# Patient Record
Sex: FEMALE | Race: WHITE | NOT HISPANIC OR LATINO | Employment: OTHER | URBAN - METROPOLITAN AREA
[De-identification: names, ages, dates, MRNs, and addresses within clinical notes are randomized per-mention and may not be internally consistent; named-entity substitution may affect disease eponyms.]

---

## 2017-01-20 ENCOUNTER — TELEPHONE (OUTPATIENT)
Dept: FAMILY MEDICINE | Age: 42
End: 2017-01-20

## 2017-01-23 ENCOUNTER — TELEPHONE (OUTPATIENT)
Dept: FAMILY MEDICINE | Age: 42
End: 2017-01-23

## 2017-01-24 ENCOUNTER — TELEPHONE (OUTPATIENT)
Dept: FAMILY MEDICINE | Age: 42
End: 2017-01-24

## 2017-01-24 DIAGNOSIS — F41.9 ANXIETY: ICD-10-CM

## 2017-01-24 RX ORDER — BUSPIRONE HYDROCHLORIDE 15 MG/1
7.5 TABLET ORAL 2 TIMES DAILY
Qty: 90 TABLET | Refills: 0 | Status: SHIPPED | OUTPATIENT
Start: 2017-01-24 | End: 2018-06-06 | Stop reason: SDUPTHER

## 2017-04-10 ENCOUNTER — OFFICE VISIT (OUTPATIENT)
Dept: URGENT CARE | Facility: PHYSICIAN GROUP | Age: 42
End: 2017-04-10
Payer: COMMERCIAL

## 2017-04-10 ENCOUNTER — TELEPHONE (OUTPATIENT)
Dept: URGENT CARE | Facility: PHYSICIAN GROUP | Age: 42
End: 2017-04-10

## 2017-04-10 VITALS
BODY MASS INDEX: 38.25 KG/M2 | HEIGHT: 66 IN | WEIGHT: 238 LBS | DIASTOLIC BLOOD PRESSURE: 84 MMHG | SYSTOLIC BLOOD PRESSURE: 122 MMHG | TEMPERATURE: 98.2 F | RESPIRATION RATE: 18 BRPM | HEART RATE: 86 BPM | OXYGEN SATURATION: 98 %

## 2017-04-10 DIAGNOSIS — S93.402A SPRAIN OF LEFT ANKLE, UNSPECIFIED LIGAMENT, INITIAL ENCOUNTER: ICD-10-CM

## 2017-04-10 PROCEDURE — 99203 OFFICE O/P NEW LOW 30 MIN: CPT | Performed by: PHYSICIAN ASSISTANT

## 2017-04-10 RX ORDER — IBUPROFEN 200 MG
200 TABLET ORAL EVERY 6 HOURS PRN
COMMUNITY
End: 2024-01-10

## 2017-04-10 ASSESSMENT — ENCOUNTER SYMPTOMS
CHILLS: 0
TINGLING: 0
LOSS OF MOTION: 0
LOSS OF SENSATION: 0
HEADACHES: 0
NAUSEA: 0
SHORTNESS OF BREATH: 0
VOMITING: 0
FEVER: 0
INABILITY TO BEAR WEIGHT: 1
DIARRHEA: 0
ABDOMINAL PAIN: 0
DIZZINESS: 0

## 2017-04-10 ASSESSMENT — PAIN SCALES - GENERAL: PAINLEVEL: 8=MODERATE-SEVERE PAIN

## 2017-04-10 ASSESSMENT — PATIENT HEALTH QUESTIONNAIRE - PHQ9: CLINICAL INTERPRETATION OF PHQ2 SCORE: 0

## 2017-04-10 NOTE — PROGRESS NOTES
"Subjective:      Jazlyn Kurtz is a 41 y.o. female who presents with Ankle Injury and Foot Swelling            Ankle Injury   The incident occurred 12 to 24 hours ago. The incident occurred at home. The injury mechanism was an inversion injury. The pain is present in the left ankle and left leg. The quality of the pain is described as aching. The pain is at a severity of 8/10. The pain is moderate. The pain has been constant since onset. Associated symptoms include an inability to bear weight. Pertinent negatives include no loss of motion, loss of sensation or tingling. She reports no foreign bodies present. The symptoms are aggravated by movement, weight bearing and palpation. She has tried NSAIDs for the symptoms. The treatment provided mild relief.     Patient presents to urgent care reporting an inversion injury to her left ankle yesterday. She states she also rolled the ankle 2 weeks ago, it was painful for about a week, then resolved. She rolled the same ankle again last night and states it is much more painful than the original injury. She reports swelling and bruising mostly on her lateral ankle. She also reports some pain in the left lower leg.     Review of Systems   Constitutional: Negative for fever and chills.   Respiratory: Negative for shortness of breath.    Cardiovascular: Negative for chest pain.   Gastrointestinal: Negative for nausea, vomiting, abdominal pain and diarrhea.   Genitourinary: Negative.    Musculoskeletal:        Positive for leg and ankle pain   Neurological: Negative for dizziness, tingling and headaches.          Objective:     /84 mmHg  Pulse 86  Temp(Src) 36.8 °C (98.2 °F)  Resp 18  Ht 1.676 m (5' 5.98\")  Wt 107.956 kg (238 lb)  BMI 38.43 kg/m2  SpO2 98%     Physical Exam   Constitutional: She is oriented to person, place, and time. She appears well-developed and well-nourished. No distress.   HENT:   Head: Normocephalic and atraumatic.   Eyes: Pupils are equal, " round, and reactive to light.   Neck: Normal range of motion.   Cardiovascular: Normal rate.    Pulmonary/Chest: Effort normal.   Musculoskeletal:        Left ankle: She exhibits decreased range of motion, swelling and ecchymosis. She exhibits normal pulse. Tenderness. Lateral malleolus tenderness found. Achilles tendon exhibits no pain.        Feet:    Neurological: She is alert and oriented to person, place, and time.   Skin: Skin is warm and dry. She is not diaphoretic.   Psychiatric: She has a normal mood and affect. Her behavior is normal.   Nursing note and vitals reviewed.         PMH:  has no past medical history on file.  MEDS:   Current outpatient prescriptions:   •  ibuprofen (MOTRIN) 200 MG Tab, Take 200 mg by mouth every 6 hours as needed., Disp: , Rfl:   ALLERGIES:   Allergies   Allergen Reactions   • Pcn [Penicillins]      SURGHX: History reviewed. No pertinent past surgical history.  SOCHX:  reports that she has been smoking Cigarettes.  She has been smoking about 0.25 packs per day. She has never used smokeless tobacco. She reports that she drinks alcohol.  FH: family history is not on file.       Assessment/Plan:     1. Sprain of left ankle, unspecified ligament, initial encounter  - DX-ANKLE 3+ VIEWS LEFT; Future  - DX-TIBIA AND FIBULA LEFT; Future    Radiologist interpretation is as follows:   1. No obvious acute bony abnormality  2. Lateral ankle soft tissue swelling consistent with a sprain, correlate clinically.   3. Large plantar spur on the calcaneous    Advised patient of her results. Her ankle was wrapped with ace bandage in the clinic. She has crutches from home she is using to ambulate. Advised limited weight bearing for 1-2 weeks. Encouraged RICE therapy and OTC Ibuprofen as needed for pain, take with food. Ice and elevate the ankle 2-3 times throughout the day.

## 2017-04-10 NOTE — TELEPHONE ENCOUNTER
Called patient let her know that she should use her crutches for a week, weight bearing only as tolerated.  Keep using the ice an ibuprofen for pain as needed.  Patient understands, but will need a note for work.  Can we fax it to [phone].

## 2017-04-10 NOTE — Clinical Note
April 10, 2017         Patient: Jazlyn Kurtz   YOB: 1975   Date of Visit: 4/10/2017           To Whom it May Concern:    Jazlyn Kurtz was seen in my clinic on 4/10/2017 for an ankle injury. She should remain on crutches for the rest of this week.     If you have any questions or concerns, please don't hesitate to call.        Sincerely,           Tamika Baxter PA-C  Electronically Signed

## 2017-05-17 ENCOUNTER — HOSPITAL ENCOUNTER (OUTPATIENT)
Dept: REHABILITATION | Age: 42
Discharge: STILL ADMITTED OR ADMINISTRATIVE | End: 2017-05-17
Attending: ORTHOPAEDIC SURGERY

## 2017-05-17 DIAGNOSIS — M79.641 RIGHT HAND PAIN: ICD-10-CM

## 2017-05-17 PROCEDURE — 97018 PARAFFIN BATH THERAPY: CPT | Performed by: OCCUPATIONAL THERAPIST

## 2017-05-17 PROCEDURE — 10003343 HB MISC ORTHOTIC OR PROSTHETIC: Performed by: OCCUPATIONAL THERAPIST

## 2017-05-17 PROCEDURE — 10004172 HB COUNTER-THERAPY VISIT OT: Performed by: OCCUPATIONAL THERAPIST

## 2017-05-17 PROCEDURE — 97530 THERAPEUTIC ACTIVITIES: CPT | Performed by: OCCUPATIONAL THERAPIST

## 2017-05-17 PROCEDURE — 10004310 HB COUNTER-SPLINT UNITS PER 15 MIN: Performed by: OCCUPATIONAL THERAPIST

## 2017-05-17 PROCEDURE — L3808 WHFO, RIGID W/O JOINTS: HCPCS | Performed by: OCCUPATIONAL THERAPIST

## 2017-05-22 ENCOUNTER — OFF PREMISE (OUTPATIENT)
Dept: OTHER | Age: 42
End: 2017-05-22

## 2017-05-23 ENCOUNTER — APPOINTMENT (OUTPATIENT)
Dept: REHABILITATION | Age: 42
End: 2017-05-23
Attending: ORTHOPAEDIC SURGERY

## 2017-05-26 ENCOUNTER — APPOINTMENT (OUTPATIENT)
Dept: REHABILITATION | Age: 42
End: 2017-05-26
Attending: ORTHOPAEDIC SURGERY

## 2017-05-30 ENCOUNTER — APPOINTMENT (OUTPATIENT)
Dept: REHABILITATION | Age: 42
End: 2017-05-30
Attending: ORTHOPAEDIC SURGERY

## 2017-06-01 ENCOUNTER — APPOINTMENT (OUTPATIENT)
Dept: REHABILITATION | Age: 42
End: 2017-06-01
Attending: ORTHOPAEDIC SURGERY

## 2017-11-19 DIAGNOSIS — F41.9 ANXIETY: ICD-10-CM

## 2017-11-20 RX ORDER — ESCITALOPRAM OXALATE 20 MG/1
TABLET ORAL
Qty: 90 TABLET | Refills: 0 | Status: SHIPPED | OUTPATIENT
Start: 2017-11-20 | End: 2018-02-18 | Stop reason: SDUPTHER

## 2018-02-18 DIAGNOSIS — F41.9 ANXIETY: ICD-10-CM

## 2018-02-19 RX ORDER — ESCITALOPRAM OXALATE 20 MG/1
20 TABLET ORAL DAILY
Qty: 90 TABLET | Refills: 0 | Status: SHIPPED | OUTPATIENT
Start: 2018-02-19 | End: 2018-06-06 | Stop reason: SDUPTHER

## 2018-06-06 ENCOUNTER — E-ADVICE (OUTPATIENT)
Dept: FAMILY MEDICINE | Age: 43
End: 2018-06-06

## 2018-06-06 DIAGNOSIS — F41.9 ANXIETY: ICD-10-CM

## 2018-06-06 RX ORDER — BUSPIRONE HYDROCHLORIDE 15 MG/1
15 TABLET ORAL 2 TIMES DAILY
Qty: 180 TABLET | Refills: 0 | Status: SHIPPED | OUTPATIENT
Start: 2018-06-06 | End: 2018-08-31 | Stop reason: SDUPTHER

## 2018-06-06 RX ORDER — ESCITALOPRAM OXALATE 20 MG/1
20 TABLET ORAL DAILY
Qty: 90 TABLET | Refills: 0 | Status: SHIPPED | OUTPATIENT
Start: 2018-06-06 | End: 2018-08-31 | Stop reason: SDUPTHER

## 2018-08-31 DIAGNOSIS — F41.9 ANXIETY: ICD-10-CM

## 2018-08-31 RX ORDER — BUSPIRONE HYDROCHLORIDE 15 MG/1
TABLET ORAL
Qty: 60 TABLET | Refills: 0 | Status: SHIPPED | OUTPATIENT
Start: 2018-08-31 | End: 2022-09-28 | Stop reason: ALTCHOICE

## 2018-08-31 RX ORDER — ESCITALOPRAM OXALATE 20 MG/1
TABLET ORAL
Qty: 30 TABLET | Refills: 0 | Status: SHIPPED | OUTPATIENT
Start: 2018-08-31 | End: 2018-09-26 | Stop reason: SDUPTHER

## 2018-09-26 DIAGNOSIS — F41.9 ANXIETY: ICD-10-CM

## 2018-09-26 RX ORDER — ESCITALOPRAM OXALATE 20 MG/1
TABLET ORAL
Qty: 30 TABLET | Refills: 2 | Status: SHIPPED | OUTPATIENT
Start: 2018-09-26 | End: 2018-10-22 | Stop reason: SDUPTHER

## 2018-10-22 DIAGNOSIS — F41.9 ANXIETY: ICD-10-CM

## 2018-10-22 RX ORDER — ESCITALOPRAM OXALATE 20 MG/1
20 TABLET ORAL DAILY
Qty: 90 TABLET | Refills: 0 | Status: SHIPPED | OUTPATIENT
Start: 2018-10-22 | End: 2019-01-20 | Stop reason: SDUPTHER

## 2018-10-22 RX ORDER — ESCITALOPRAM OXALATE 20 MG/1
20 TABLET ORAL DAILY
Qty: 90 TABLET | Refills: 0 | Status: SHIPPED | OUTPATIENT
Start: 2018-10-22 | End: 2018-10-22 | Stop reason: SDUPTHER

## 2018-10-22 RX ORDER — ESCITALOPRAM OXALATE 20 MG/1
20 TABLET ORAL DAILY
Qty: 30 TABLET | Refills: 2 | OUTPATIENT
Start: 2018-10-22

## 2019-01-20 DIAGNOSIS — F41.9 ANXIETY: ICD-10-CM

## 2019-01-21 RX ORDER — ESCITALOPRAM OXALATE 20 MG/1
20 TABLET ORAL DAILY
Qty: 30 TABLET | Refills: 0 | Status: SHIPPED | OUTPATIENT
Start: 2019-01-21 | End: 2019-02-18 | Stop reason: SDUPTHER

## 2019-02-18 DIAGNOSIS — F41.9 ANXIETY: ICD-10-CM

## 2019-02-18 RX ORDER — ESCITALOPRAM OXALATE 20 MG/1
20 TABLET ORAL DAILY
Qty: 30 TABLET | Refills: 0 | Status: SHIPPED | OUTPATIENT
Start: 2019-02-18 | End: 2020-10-09

## 2019-04-24 ENCOUNTER — NON-PROVIDER VISIT (OUTPATIENT)
Dept: MEDICAL GROUP | Facility: CLINIC | Age: 44
End: 2019-04-24
Payer: COMMERCIAL

## 2019-04-24 DIAGNOSIS — Z22.7 INACTIVE TUBERCULOSIS: ICD-10-CM

## 2019-04-24 PROCEDURE — 36415 COLL VENOUS BLD VENIPUNCTURE: CPT | Performed by: FAMILY MEDICINE

## 2020-08-29 ENCOUNTER — HOSPITAL ENCOUNTER (EMERGENCY)
Age: 45
Discharge: HOME | End: 2020-08-29
Attending: EMERGENCY MEDICINE

## 2020-08-29 ENCOUNTER — APPOINTMENT (OUTPATIENT)
Dept: GENERAL RADIOLOGY | Age: 45
End: 2020-08-29
Attending: EMERGENCY MEDICINE

## 2020-08-29 ENCOUNTER — APPOINTMENT (OUTPATIENT)
Dept: MRI IMAGING | Age: 45
End: 2020-08-29
Attending: EMERGENCY MEDICINE

## 2020-08-29 ENCOUNTER — APPOINTMENT (OUTPATIENT)
Dept: CT IMAGING | Age: 45
End: 2020-08-29
Attending: EMERGENCY MEDICINE

## 2020-08-29 VITALS
HEIGHT: 67 IN | HEART RATE: 61 BPM | WEIGHT: 204.81 LBS | SYSTOLIC BLOOD PRESSURE: 122 MMHG | DIASTOLIC BLOOD PRESSURE: 76 MMHG | RESPIRATION RATE: 16 BRPM | BODY MASS INDEX: 32.15 KG/M2 | TEMPERATURE: 98.7 F | OXYGEN SATURATION: 97 %

## 2020-08-29 DIAGNOSIS — M54.12 CERVICAL RADICULOPATHY: ICD-10-CM

## 2020-08-29 DIAGNOSIS — R20.2 FACIAL PARESTHESIA: Primary | ICD-10-CM

## 2020-08-29 DIAGNOSIS — R20.2 PARESTHESIA OF ARM: ICD-10-CM

## 2020-08-29 DIAGNOSIS — R51.9 NONINTRACTABLE HEADACHE, UNSPECIFIED CHRONICITY PATTERN, UNSPECIFIED HEADACHE TYPE: ICD-10-CM

## 2020-08-29 DIAGNOSIS — R07.9 CHEST PAIN, UNSPECIFIED TYPE: ICD-10-CM

## 2020-08-29 LAB
ALBUMIN SERPL-MCNC: 3.2 G/DL (ref 3.6–5.1)
ALBUMIN/GLOB SERPL: 0.9 {RATIO} (ref 1–2.4)
ALP SERPL-CCNC: 58 UNITS/L (ref 45–117)
ALT SERPL-CCNC: 19 UNITS/L
ANION GAP SERPL CALC-SCNC: 10 MMOL/L (ref 10–20)
AST SERPL-CCNC: 20 UNITS/L
ATRIAL RATE (BPM): 62
BASOPHILS # BLD: 0 K/MCL (ref 0–0.3)
BASOPHILS NFR BLD: 0 %
BILIRUB SERPL-MCNC: 0.5 MG/DL (ref 0.2–1)
BUN SERPL-MCNC: 9 MG/DL (ref 6–20)
BUN/CREAT SERPL: 12 (ref 7–25)
CALCIUM SERPL-MCNC: 8.7 MG/DL (ref 8.4–10.2)
CHLORIDE SERPL-SCNC: 107 MMOL/L (ref 98–107)
CO2 SERPL-SCNC: 27 MMOL/L (ref 21–32)
CREAT SERPL-MCNC: 0.73 MG/DL (ref 0.51–0.95)
CRP SERPL-MCNC: 3.8 MG/DL
D DIMER PPP FEU-MCNC: 0.77 MG/L (FEU)
DEPRECATED RDW RBC: 49.5 FL (ref 39–50)
DIASTOLIC BLOOD PRESSURE: 71
EOSINOPHIL # BLD: 0.1 K/MCL (ref 0.1–0.5)
EOSINOPHIL NFR BLD: 1 %
ERYTHROCYTE [DISTWIDTH] IN BLOOD: 14.8 % (ref 11–15)
ERYTHROCYTE [SEDIMENTATION RATE] IN BLOOD BY WESTERGREN METHOD: 32 MM/HR (ref 0–20)
FASTING DURATION TIME PATIENT: ABNORMAL H
GFR SERPLBLD BASED ON 1.73 SQ M-ARVRAT: >90 ML/MIN/1.73M2
GLOBULIN SER-MCNC: 3.6 G/DL (ref 2–4)
GLUCOSE SERPL-MCNC: 68 MG/DL (ref 65–99)
HCT VFR BLD CALC: 36.6 % (ref 36–46.5)
HGB BLD-MCNC: 11.7 G/DL (ref 12–15.5)
IMM GRANULOCYTES # BLD AUTO: 0 K/MCL (ref 0–0.2)
IMM GRANULOCYTES # BLD: 0 %
LIPASE SERPL-CCNC: 98 UNITS/L (ref 73–393)
LYMPHOCYTES # BLD: 1.4 K/MCL (ref 1–4.8)
LYMPHOCYTES NFR BLD: 20 %
MAGNESIUM SERPL-MCNC: 1.8 MG/DL (ref 1.7–2.4)
MCH RBC QN AUTO: 29.3 PG (ref 26–34)
MCHC RBC AUTO-ENTMCNC: 32 G/DL (ref 32–36.5)
MCV RBC AUTO: 91.5 FL (ref 78–100)
MONOCYTES # BLD: 0.5 K/MCL (ref 0.3–0.9)
MONOCYTES NFR BLD: 7 %
NEUTROPHILS # BLD: 4.9 K/MCL (ref 1.8–7.7)
NEUTROPHILS NFR BLD: 72 %
NRBC BLD MANUAL-RTO: 0 /100 WBC
NT-PROBNP SERPL-MCNC: 161 PG/ML
P AXIS (DEGREES): 37
PLATELET # BLD AUTO: 270 K/MCL (ref 140–450)
POTASSIUM SERPL-SCNC: 3.2 MMOL/L (ref 3.4–5.1)
PR-INTERVAL (MSEC): 128
PROCALCITONIN SERPL IA-MCNC: <0.05 NG/ML
PROT SERPL-MCNC: 6.8 G/DL (ref 6.4–8.2)
QRS-INTERVAL (MSEC): 80
QT-INTERVAL (MSEC): 428
QTC: 435
R AXIS (DEGREES): 36
RBC # BLD: 4 MIL/MCL (ref 4–5.2)
REPORT TEXT: NORMAL
SODIUM SERPL-SCNC: 141 MMOL/L (ref 135–145)
SYSTOLIC BLOOD PRESSURE: 118
T AXIS (DEGREES): 16
TROPONIN I SERPL HS-MCNC: <0.02 NG/ML
TROPONIN I SERPL HS-MCNC: <0.02 NG/ML
TSH SERPL-ACNC: 3.35 MCUNITS/ML (ref 0.35–5)
VENTRICULAR RATE EKG/MIN (BPM): 62
WBC # BLD: 7 K/MCL (ref 4.2–11)

## 2020-08-29 PROCEDURE — 85652 RBC SED RATE AUTOMATED: CPT | Performed by: EMERGENCY MEDICINE

## 2020-08-29 PROCEDURE — 83880 ASSAY OF NATRIURETIC PEPTIDE: CPT | Performed by: EMERGENCY MEDICINE

## 2020-08-29 PROCEDURE — 70553 MRI BRAIN STEM W/O & W/DYE: CPT

## 2020-08-29 PROCEDURE — 80053 COMPREHEN METABOLIC PANEL: CPT | Performed by: EMERGENCY MEDICINE

## 2020-08-29 PROCEDURE — 10002805 HB CONTRAST AGENT: Performed by: RADIOLOGY

## 2020-08-29 PROCEDURE — 72156 MRI NECK SPINE W/O & W/DYE: CPT

## 2020-08-29 PROCEDURE — 87635 SARS-COV-2 COVID-19 AMP PRB: CPT | Performed by: EMERGENCY MEDICINE

## 2020-08-29 PROCEDURE — 10002803 HB RX 637: Performed by: EMERGENCY MEDICINE

## 2020-08-29 PROCEDURE — 71045 X-RAY EXAM CHEST 1 VIEW: CPT

## 2020-08-29 PROCEDURE — 36415 COLL VENOUS BLD VENIPUNCTURE: CPT

## 2020-08-29 PROCEDURE — 84145 PROCALCITONIN (PCT): CPT | Performed by: EMERGENCY MEDICINE

## 2020-08-29 PROCEDURE — 71275 CT ANGIOGRAPHY CHEST: CPT

## 2020-08-29 PROCEDURE — 83735 ASSAY OF MAGNESIUM: CPT | Performed by: EMERGENCY MEDICINE

## 2020-08-29 PROCEDURE — 84443 ASSAY THYROID STIM HORMONE: CPT | Performed by: EMERGENCY MEDICINE

## 2020-08-29 PROCEDURE — 83690 ASSAY OF LIPASE: CPT | Performed by: EMERGENCY MEDICINE

## 2020-08-29 PROCEDURE — 93005 ELECTROCARDIOGRAM TRACING: CPT | Performed by: EMERGENCY MEDICINE

## 2020-08-29 PROCEDURE — 84484 ASSAY OF TROPONIN QUANT: CPT | Performed by: EMERGENCY MEDICINE

## 2020-08-29 PROCEDURE — 86140 C-REACTIVE PROTEIN: CPT | Performed by: EMERGENCY MEDICINE

## 2020-08-29 PROCEDURE — 10002800 HB RX 250 W HCPCS: Performed by: EMERGENCY MEDICINE

## 2020-08-29 PROCEDURE — 85025 COMPLETE CBC W/AUTO DIFF WBC: CPT | Performed by: EMERGENCY MEDICINE

## 2020-08-29 PROCEDURE — 99285 EMERGENCY DEPT VISIT HI MDM: CPT

## 2020-08-29 PROCEDURE — 85379 FIBRIN DEGRADATION QUANT: CPT | Performed by: EMERGENCY MEDICINE

## 2020-08-29 PROCEDURE — A9585 GADOBUTROL INJECTION: HCPCS | Performed by: RADIOLOGY

## 2020-08-29 RX ORDER — PROCHLORPERAZINE EDISYLATE 5 MG/ML
10 INJECTION INTRAMUSCULAR; INTRAVENOUS ONCE
Status: DISCONTINUED | OUTPATIENT
Start: 2020-08-29 | End: 2020-08-29 | Stop reason: CLARIF

## 2020-08-29 RX ORDER — PANTOPRAZOLE SODIUM 40 MG/1
40 TABLET, DELAYED RELEASE ORAL DAILY
COMMUNITY

## 2020-08-29 RX ORDER — FLUOXETINE HYDROCHLORIDE 40 MG/1
40 CAPSULE ORAL DAILY
COMMUNITY
End: 2023-02-09

## 2020-08-29 RX ORDER — GADOBUTROL 604.72 MG/ML
10 INJECTION INTRAVENOUS ONCE
Status: COMPLETED | OUTPATIENT
Start: 2020-08-29 | End: 2020-08-29

## 2020-08-29 RX ORDER — GABAPENTIN 300 MG/1
300 CAPSULE ORAL 3 TIMES DAILY
Qty: 27 CAPSULE | Refills: 0 | Status: SHIPPED | OUTPATIENT
Start: 2020-08-29 | End: 2020-10-09

## 2020-08-29 RX ORDER — POTASSIUM CHLORIDE 20 MEQ/1
40 TABLET, EXTENDED RELEASE ORAL ONCE
Status: COMPLETED | OUTPATIENT
Start: 2020-08-29 | End: 2020-08-29

## 2020-08-29 RX ORDER — PROCHLORPERAZINE EDISYLATE 5 MG/ML
10 INJECTION INTRAMUSCULAR; INTRAVENOUS ONCE
Status: COMPLETED | OUTPATIENT
Start: 2020-08-29 | End: 2020-08-29

## 2020-08-29 RX ORDER — DIPHENHYDRAMINE HYDROCHLORIDE 50 MG/ML
25 INJECTION INTRAMUSCULAR; INTRAVENOUS ONCE
Status: COMPLETED | OUTPATIENT
Start: 2020-08-29 | End: 2020-08-29

## 2020-08-29 RX ADMIN — GADOBUTROL 10 ML: 604.72 INJECTION INTRAVENOUS at 18:25

## 2020-08-29 RX ADMIN — KETOROLAC TROMETHAMINE 30 MG: 30 INJECTION, SOLUTION INTRAMUSCULAR at 15:17

## 2020-08-29 RX ADMIN — POTASSIUM CHLORIDE 40 MEQ: 1500 TABLET, EXTENDED RELEASE ORAL at 15:53

## 2020-08-29 RX ADMIN — IOPAMIDOL 75 ML: 755 INJECTION, SOLUTION INTRAVENOUS at 16:52

## 2020-08-29 RX ADMIN — DIPHENHYDRAMINE HYDROCHLORIDE 25 MG: 50 INJECTION INTRAMUSCULAR; INTRAVENOUS at 15:15

## 2020-08-29 RX ADMIN — PROCHLORPERAZINE EDISYLATE 10 MG: 5 INJECTION INTRAMUSCULAR; INTRAVENOUS at 15:19

## 2020-08-29 ASSESSMENT — ENCOUNTER SYMPTOMS
NAUSEA: 1
DIZZINESS: 0
DIARRHEA: 0
ABDOMINAL PAIN: 1
ADENOPATHY: 0
VOMITING: 0
CONFUSION: 0
NUMBNESS: 1
FEVER: 0
HEADACHES: 0
COUGH: 1
SHORTNESS OF BREATH: 0
SORE THROAT: 0

## 2020-08-29 ASSESSMENT — PAIN DESCRIPTION - PAIN TYPE
TYPE: ACUTE PAIN;CHEST PAIN
TYPE: ACUTE PAIN;CHEST PAIN

## 2020-08-29 ASSESSMENT — PAIN SCALES - GENERAL
PAINLEVEL_OUTOF10: 5
PAINLEVEL_OUTOF10: 8
PAINLEVEL_OUTOF10: 5

## 2020-08-30 LAB
RAINBOW EXTRA TUBES HOLD SPECIMEN: NORMAL

## 2020-08-31 ENCOUNTER — TELEPHONE (OUTPATIENT)
Dept: NEUROLOGY | Age: 45
End: 2020-08-31

## 2020-09-01 LAB — SARS-COV-2 RNA RESP QL NAA+PROBE: NOT DETECTED

## 2020-10-09 ENCOUNTER — V-VISIT (OUTPATIENT)
Dept: NEUROLOGY | Age: 45
End: 2020-10-09
Attending: EMERGENCY MEDICINE

## 2020-10-09 DIAGNOSIS — R51.9 NONINTRACTABLE HEADACHE, UNSPECIFIED CHRONICITY PATTERN, UNSPECIFIED HEADACHE TYPE: ICD-10-CM

## 2020-10-09 DIAGNOSIS — R20.2 FACIAL PARESTHESIA: Primary | ICD-10-CM

## 2020-10-09 PROCEDURE — 99205 OFFICE O/P NEW HI 60 MIN: CPT | Performed by: PSYCHIATRY & NEUROLOGY

## 2020-10-09 RX ORDER — VERAPAMIL HYDROCHLORIDE 80 MG/1
TABLET ORAL
Qty: 90 TABLET | Refills: 1 | Status: SHIPPED | OUTPATIENT
Start: 2020-10-09 | End: 2022-09-28 | Stop reason: ALTCHOICE

## 2020-11-03 RX ORDER — VERAPAMIL HYDROCHLORIDE 80 MG/1
TABLET ORAL
Qty: 90 TABLET | Refills: 1 | OUTPATIENT
Start: 2020-11-03

## 2020-12-02 ENCOUNTER — TELEPHONE (OUTPATIENT)
Dept: NEUROLOGY | Age: 45
End: 2020-12-02

## 2020-12-11 RX ORDER — VERAPAMIL HYDROCHLORIDE 80 MG/1
TABLET ORAL
Qty: 90 TABLET | Refills: 1 | OUTPATIENT
Start: 2020-12-11

## 2021-01-06 ENCOUNTER — HOSPITAL ENCOUNTER (OUTPATIENT)
Dept: GENERAL RADIOLOGY | Age: 46
Discharge: HOME | End: 2021-01-06
Attending: NEUROLOGICAL SURGERY

## 2021-01-06 DIAGNOSIS — Z98.1 S/P CERVICAL SPINAL FUSION: ICD-10-CM

## 2021-01-06 PROBLEM — M54.10 RADICULOPATHY AFFECTING UPPER EXTREMITY: Status: ACTIVE | Noted: 2020-09-28

## 2021-01-06 PROBLEM — K92.1 BLOOD IN STOOL: Status: ACTIVE | Noted: 2020-08-12

## 2021-01-06 PROBLEM — M25.531 PAIN IN RIGHT WRIST: Status: ACTIVE | Noted: 2017-05-22

## 2021-01-06 PROBLEM — K43.2 INCISIONAL HERNIA: Status: ACTIVE | Noted: 2020-02-13

## 2021-01-06 PROBLEM — R10.2 ACUTE PELVIC PAIN, FEMALE: Status: ACTIVE | Noted: 2020-06-29

## 2021-01-06 PROBLEM — Z82.49 FAMILY HISTORY OF PREMATURE CAD: Status: ACTIVE | Noted: 2020-08-10

## 2021-01-06 PROBLEM — M54.2 CERVICAL PAIN: Status: ACTIVE | Noted: 2020-09-28

## 2021-01-06 PROBLEM — K21.9 GASTROESOPHAGEAL REFLUX DISEASE WITHOUT ESOPHAGITIS: Status: ACTIVE | Noted: 2020-08-12

## 2021-01-06 PROBLEM — N39.3 SUI (STRESS URINARY INCONTINENCE, FEMALE): Status: ACTIVE | Noted: 2020-05-29

## 2021-01-06 PROCEDURE — 72040 X-RAY EXAM NECK SPINE 2-3 VW: CPT

## 2021-03-16 ENCOUNTER — APPOINTMENT (OUTPATIENT)
Dept: CT IMAGING | Age: 46
End: 2021-03-16
Attending: EMERGENCY MEDICINE

## 2021-03-16 ENCOUNTER — HOSPITAL ENCOUNTER (EMERGENCY)
Age: 46
Discharge: HOME | End: 2021-03-16
Attending: EMERGENCY MEDICINE

## 2021-03-16 VITALS
OXYGEN SATURATION: 98 % | TEMPERATURE: 99.2 F | WEIGHT: 202.9 LBS | SYSTOLIC BLOOD PRESSURE: 147 MMHG | HEART RATE: 71 BPM | RESPIRATION RATE: 17 BRPM | BODY MASS INDEX: 31.84 KG/M2 | DIASTOLIC BLOOD PRESSURE: 91 MMHG | HEIGHT: 67 IN

## 2021-03-16 DIAGNOSIS — R10.84 GENERALIZED ABDOMINAL PAIN: Primary | ICD-10-CM

## 2021-03-16 DIAGNOSIS — I88.0 MESENTERIC ADENITIS: ICD-10-CM

## 2021-03-16 LAB
ALBUMIN SERPL-MCNC: 3.4 G/DL (ref 3.6–5.1)
ALBUMIN/GLOB SERPL: 0.9 {RATIO} (ref 1–2.4)
ALP SERPL-CCNC: 71 UNITS/L (ref 45–117)
ALT SERPL-CCNC: 23 UNITS/L
ANION GAP SERPL CALC-SCNC: 8 MMOL/L (ref 10–20)
APPEARANCE UR: NORMAL
AST SERPL-CCNC: 12 UNITS/L
ATRIAL RATE (BPM): 77
BASOPHILS # BLD: 0 K/MCL (ref 0–0.3)
BASOPHILS NFR BLD: 1 %
BILIRUB SERPL-MCNC: 0.2 MG/DL (ref 0.2–1)
BILIRUB UR QL STRIP: NEGATIVE
BUN SERPL-MCNC: 18 MG/DL (ref 6–20)
BUN/CREAT SERPL: 24 (ref 7–25)
CALCIUM SERPL-MCNC: 9 MG/DL (ref 8.4–10.2)
CHLORIDE SERPL-SCNC: 106 MMOL/L (ref 98–107)
CO2 SERPL-SCNC: 31 MMOL/L (ref 21–32)
COLOR UR: YELLOW
CREAT SERPL-MCNC: 0.74 MG/DL (ref 0.51–0.95)
DEPRECATED RDW RBC: 43.8 FL (ref 39–50)
EOSINOPHIL # BLD: 0.2 K/MCL (ref 0–0.5)
EOSINOPHIL NFR BLD: 2 %
ERYTHROCYTE [DISTWIDTH] IN BLOOD: 13.2 % (ref 11–15)
FASTING DURATION TIME PATIENT: ABNORMAL H
GFR SERPLBLD BASED ON 1.73 SQ M-ARVRAT: >90 ML/MIN/1.73M2
GLOBULIN SER-MCNC: 4 G/DL (ref 2–4)
GLUCOSE SERPL-MCNC: 108 MG/DL (ref 65–99)
GLUCOSE UR STRIP-MCNC: NEGATIVE MG/DL
HCT VFR BLD CALC: 43.4 % (ref 36–46.5)
HGB BLD-MCNC: 13.8 G/DL (ref 12–15.5)
HGB UR QL STRIP: NEGATIVE
IMM GRANULOCYTES # BLD AUTO: 0 K/MCL (ref 0–0.2)
IMM GRANULOCYTES # BLD: 0 %
KETONES UR STRIP-MCNC: NEGATIVE MG/DL
LEUKOCYTE ESTERASE UR QL STRIP: NEGATIVE
LIPASE SERPL-CCNC: 142 UNITS/L (ref 73–393)
LYMPHOCYTES # BLD: 1.5 K/MCL (ref 1–4.8)
LYMPHOCYTES NFR BLD: 17 %
MCH RBC QN AUTO: 28.6 PG (ref 26–34)
MCHC RBC AUTO-ENTMCNC: 31.8 G/DL (ref 32–36.5)
MCV RBC AUTO: 90 FL (ref 78–100)
MONOCYTES # BLD: 0.5 K/MCL (ref 0.3–0.9)
MONOCYTES NFR BLD: 6 %
NEUTROPHILS # BLD: 6.7 K/MCL (ref 1.8–7.7)
NEUTROPHILS NFR BLD: 74 %
NITRITE UR QL STRIP: NEGATIVE
NRBC BLD MANUAL-RTO: 0 /100 WBC
P AXIS (DEGREES): 66
PH UR STRIP: 5 [PH] (ref 5–7)
PLATELET # BLD AUTO: 299 K/MCL (ref 140–450)
POTASSIUM SERPL-SCNC: 4.1 MMOL/L (ref 3.4–5.1)
PR-INTERVAL (MSEC): 128
PROT SERPL-MCNC: 7.4 G/DL (ref 6.4–8.2)
PROT UR STRIP-MCNC: NEGATIVE MG/DL
QRS-INTERVAL (MSEC): 78
QT-INTERVAL (MSEC): 374
QTC: 423
R AXIS (DEGREES): 61
RBC # BLD: 4.82 MIL/MCL (ref 4–5.2)
REPORT TEXT: NORMAL
SARS-COV-2 RNA RESP QL NAA+PROBE: NOT DETECTED
SERVICE CMNT-IMP: NORMAL
SERVICE CMNT-IMP: NORMAL
SODIUM SERPL-SCNC: 141 MMOL/L (ref 135–145)
SP GR UR STRIP: 1.03 (ref 1–1.03)
T AXIS (DEGREES): 4
TROPONIN I SERPL HS-MCNC: <0.02 NG/ML
UROBILINOGEN UR STRIP-MCNC: 0.2 MG/DL
VENTRICULAR RATE EKG/MIN (BPM): 77
WBC # BLD: 8.9 K/MCL (ref 4.2–11)

## 2021-03-16 PROCEDURE — 10002800 HB RX 250 W HCPCS: Performed by: EMERGENCY MEDICINE

## 2021-03-16 PROCEDURE — 80053 COMPREHEN METABOLIC PANEL: CPT | Performed by: EMERGENCY MEDICINE

## 2021-03-16 PROCEDURE — 85025 COMPLETE CBC W/AUTO DIFF WBC: CPT | Performed by: EMERGENCY MEDICINE

## 2021-03-16 PROCEDURE — 93005 ELECTROCARDIOGRAM TRACING: CPT | Performed by: EMERGENCY MEDICINE

## 2021-03-16 PROCEDURE — 10002807 HB RX 258: Performed by: EMERGENCY MEDICINE

## 2021-03-16 PROCEDURE — U0003 INFECTIOUS AGENT DETECTION BY NUCLEIC ACID (DNA OR RNA); SEVERE ACUTE RESPIRATORY SYNDROME CORONAVIRUS 2 (SARS-COV-2) (CORONAVIRUS DISEASE [COVID-19]), AMPLIFIED PROBE TECHNIQUE, MAKING USE OF HIGH THROUGHPUT TECHNOLOGIES AS DESCRIBED BY CMS-2020-01-R: HCPCS | Performed by: EMERGENCY MEDICINE

## 2021-03-16 PROCEDURE — 10002803 HB RX 637: Performed by: EMERGENCY MEDICINE

## 2021-03-16 PROCEDURE — 10002805 HB CONTRAST AGENT: Performed by: RADIOLOGY

## 2021-03-16 PROCEDURE — 83690 ASSAY OF LIPASE: CPT | Performed by: EMERGENCY MEDICINE

## 2021-03-16 PROCEDURE — 96361 HYDRATE IV INFUSION ADD-ON: CPT

## 2021-03-16 PROCEDURE — 36415 COLL VENOUS BLD VENIPUNCTURE: CPT

## 2021-03-16 PROCEDURE — 96374 THER/PROPH/DIAG INJ IV PUSH: CPT

## 2021-03-16 PROCEDURE — 96375 TX/PRO/DX INJ NEW DRUG ADDON: CPT

## 2021-03-16 PROCEDURE — 84484 ASSAY OF TROPONIN QUANT: CPT | Performed by: EMERGENCY MEDICINE

## 2021-03-16 PROCEDURE — 74177 CT ABD & PELVIS W/CONTRAST: CPT

## 2021-03-16 PROCEDURE — 10002801 HB RX 250 W/O HCPCS: Performed by: EMERGENCY MEDICINE

## 2021-03-16 PROCEDURE — 81003 URINALYSIS AUTO W/O SCOPE: CPT | Performed by: EMERGENCY MEDICINE

## 2021-03-16 PROCEDURE — 99284 EMERGENCY DEPT VISIT MOD MDM: CPT

## 2021-03-16 RX ORDER — LIDOCAINE HYDROCHLORIDE 20 MG/ML
15 SOLUTION OROPHARYNGEAL ONCE
Status: COMPLETED | OUTPATIENT
Start: 2021-03-16 | End: 2021-03-16

## 2021-03-16 RX ORDER — ONDANSETRON 4 MG/1
4 TABLET, ORALLY DISINTEGRATING ORAL EVERY 8 HOURS PRN
Qty: 10 TABLET | Refills: 0 | Status: SHIPPED | OUTPATIENT
Start: 2021-03-16 | End: 2022-09-28 | Stop reason: ALTCHOICE

## 2021-03-16 RX ORDER — ONDANSETRON 2 MG/ML
4 INJECTION INTRAMUSCULAR; INTRAVENOUS ONCE
Status: COMPLETED | OUTPATIENT
Start: 2021-03-16 | End: 2021-03-16

## 2021-03-16 RX ORDER — MAGNESIUM HYDROXIDE/ALUMINUM HYDROXICE/SIMETHICONE 120; 1200; 1200 MG/30ML; MG/30ML; MG/30ML
30 SUSPENSION ORAL ONCE
Status: COMPLETED | OUTPATIENT
Start: 2021-03-16 | End: 2021-03-16

## 2021-03-16 RX ORDER — ACETAMINOPHEN 500 MG
1000 TABLET ORAL EVERY 6 HOURS PRN
Qty: 60 TABLET | Refills: 0 | Status: SHIPPED | OUTPATIENT
Start: 2021-03-16 | End: 2021-04-05

## 2021-03-16 RX ADMIN — ONDANSETRON 4 MG: 2 INJECTION INTRAMUSCULAR; INTRAVENOUS at 10:00

## 2021-03-16 RX ADMIN — LIDOCAINE HYDROCHLORIDE 15 ML: 20 SOLUTION ORAL; TOPICAL at 09:57

## 2021-03-16 RX ADMIN — ALUMINUM HYDROXIDE, MAGNESIUM HYDROXIDE, AND SIMETHICONE 30 ML: 200; 200; 20 SUSPENSION ORAL at 09:57

## 2021-03-16 RX ADMIN — SODIUM CHLORIDE 1000 ML: 9 INJECTION, SOLUTION INTRAVENOUS at 10:00

## 2021-03-16 RX ADMIN — MORPHINE SULFATE 4 MG: 4 INJECTION INTRAVENOUS at 10:49

## 2021-03-16 RX ADMIN — IOHEXOL 100 ML: 350 INJECTION, SOLUTION INTRAVENOUS at 10:24

## 2021-03-16 RX ADMIN — SODIUM CHLORIDE 70 ML: 9 INJECTION, SOLUTION INTRAVENOUS at 10:24

## 2021-03-16 ASSESSMENT — ENCOUNTER SYMPTOMS
HEADACHES: 1
WHEEZING: 0
COUGH: 0
CONSTIPATION: 0
CHILLS: 1
SHORTNESS OF BREATH: 0
HALLUCINATIONS: 0
DIZZINESS: 1
ABDOMINAL PAIN: 1
SEIZURES: 0
FEVER: 0
BLOOD IN STOOL: 0
ABDOMINAL DISTENTION: 0
VOMITING: 1
ACTIVITY CHANGE: 0
COLOR CHANGE: 0
WEAKNESS: 0
POLYPHAGIA: 0
SORE THROAT: 0
AGITATION: 0
SINUS PAIN: 0
DIAPHORESIS: 1
FATIGUE: 0
DIARRHEA: 1
POLYDIPSIA: 0
APPETITE CHANGE: 0
NAUSEA: 1
NUMBNESS: 0

## 2021-03-16 ASSESSMENT — PAIN DESCRIPTION - PAIN TYPE: TYPE: ACUTE PAIN

## 2021-03-16 ASSESSMENT — PAIN SCALES - GENERAL
PAINLEVEL_OUTOF10: 8
PAINLEVEL_OUTOF10: 7

## 2021-04-26 ENCOUNTER — HOSPITAL ENCOUNTER (OUTPATIENT)
Age: 46
Discharge: HOME | End: 2021-04-26

## 2021-04-26 VITALS
RESPIRATION RATE: 16 BRPM | DIASTOLIC BLOOD PRESSURE: 81 MMHG | SYSTOLIC BLOOD PRESSURE: 154 MMHG | BODY MASS INDEX: 32.31 KG/M2 | OXYGEN SATURATION: 100 % | WEIGHT: 205.9 LBS | TEMPERATURE: 97.7 F | HEIGHT: 67 IN | HEART RATE: 71 BPM

## 2021-04-26 DIAGNOSIS — R09.81 NASAL CONGESTION: Primary | ICD-10-CM

## 2021-04-26 DIAGNOSIS — R52 BODY ACHES: ICD-10-CM

## 2021-04-26 DIAGNOSIS — Z20.822 ENCOUNTER FOR LABORATORY TESTING FOR COVID-19 VIRUS: ICD-10-CM

## 2021-04-26 DIAGNOSIS — R51.9 GENERALIZED HEADACHES: ICD-10-CM

## 2021-04-26 LAB
SARS-COV-2 N GENE CT SPEC QN NAA N2: NORMAL
SARS-COV-2 RNA RESP QL NAA+PROBE: NOT DETECTED
SERVICE CMNT-IMP: NORMAL
SERVICE CMNT-IMP: NORMAL

## 2021-04-26 PROCEDURE — 87635 SARS-COV-2 COVID-19 AMP PRB: CPT | Performed by: PHYSICIAN ASSISTANT

## 2021-04-26 PROCEDURE — 99211 OFF/OP EST MAY X REQ PHY/QHP: CPT

## 2021-04-26 ASSESSMENT — PAIN SCALES - GENERAL: PAINLEVEL_OUTOF10: 1

## 2021-05-11 ENCOUNTER — HOSPITAL ENCOUNTER (EMERGENCY)
Age: 46
Discharge: HOME | End: 2021-05-11
Attending: EMERGENCY MEDICINE

## 2021-05-11 ENCOUNTER — APPOINTMENT (OUTPATIENT)
Dept: GENERAL RADIOLOGY | Age: 46
End: 2021-05-11
Attending: EMERGENCY MEDICINE

## 2021-05-11 ENCOUNTER — APPOINTMENT (OUTPATIENT)
Dept: ULTRASOUND IMAGING | Age: 46
End: 2021-05-11
Attending: EMERGENCY MEDICINE

## 2021-05-11 VITALS
TEMPERATURE: 98.9 F | HEIGHT: 67 IN | SYSTOLIC BLOOD PRESSURE: 149 MMHG | RESPIRATION RATE: 20 BRPM | WEIGHT: 206.13 LBS | BODY MASS INDEX: 32.35 KG/M2 | OXYGEN SATURATION: 100 % | DIASTOLIC BLOOD PRESSURE: 77 MMHG | HEART RATE: 61 BPM

## 2021-05-11 DIAGNOSIS — K21.9 GASTROESOPHAGEAL REFLUX DISEASE WITHOUT ESOPHAGITIS: ICD-10-CM

## 2021-05-11 DIAGNOSIS — R51.9 NONINTRACTABLE HEADACHE, UNSPECIFIED CHRONICITY PATTERN, UNSPECIFIED HEADACHE TYPE: ICD-10-CM

## 2021-05-11 DIAGNOSIS — R07.9 CHEST PAIN, UNSPECIFIED TYPE: Primary | ICD-10-CM

## 2021-05-11 LAB
ALBUMIN SERPL-MCNC: 3.3 G/DL (ref 3.6–5.1)
ALBUMIN/GLOB SERPL: 0.9 {RATIO} (ref 1–2.4)
ALP SERPL-CCNC: 58 UNITS/L (ref 45–117)
ALT SERPL-CCNC: 24 UNITS/L
ANION GAP SERPL CALC-SCNC: 9 MMOL/L (ref 10–20)
AST SERPL-CCNC: 15 UNITS/L
ATRIAL RATE (BPM): 69
BASOPHILS # BLD: 0 K/MCL (ref 0–0.3)
BASOPHILS NFR BLD: 1 %
BILIRUB SERPL-MCNC: 0.5 MG/DL (ref 0.2–1)
BUN SERPL-MCNC: 17 MG/DL (ref 6–20)
BUN/CREAT SERPL: 24 (ref 7–25)
CALCIUM SERPL-MCNC: 8.6 MG/DL (ref 8.4–10.2)
CHLORIDE SERPL-SCNC: 105 MMOL/L (ref 98–107)
CO2 SERPL-SCNC: 27 MMOL/L (ref 21–32)
CREAT SERPL-MCNC: 0.71 MG/DL (ref 0.51–0.95)
D DIMER PPP FEU-MCNC: 0.42 MG/L (FEU)
DEPRECATED RDW RBC: 45.4 FL (ref 39–50)
DIASTOLIC BLOOD PRESSURE: 79
EOSINOPHIL # BLD: 0.3 K/MCL (ref 0–0.5)
EOSINOPHIL NFR BLD: 3 %
ERYTHROCYTE [DISTWIDTH] IN BLOOD: 14.1 % (ref 11–15)
FASTING DURATION TIME PATIENT: ABNORMAL H
GFR SERPLBLD BASED ON 1.73 SQ M-ARVRAT: >90 ML/MIN/1.73M2
GLOBULIN SER-MCNC: 3.5 G/DL (ref 2–4)
GLUCOSE SERPL-MCNC: 87 MG/DL (ref 65–99)
HCT VFR BLD CALC: 40.5 % (ref 36–46.5)
HGB BLD-MCNC: 13.3 G/DL (ref 12–15.5)
IMM GRANULOCYTES # BLD AUTO: 0 K/MCL (ref 0–0.2)
IMM GRANULOCYTES # BLD: 0 %
LYMPHOCYTES # BLD: 1.4 K/MCL (ref 1–4.8)
LYMPHOCYTES NFR BLD: 17 %
MCH RBC QN AUTO: 29.2 PG (ref 26–34)
MCHC RBC AUTO-ENTMCNC: 32.8 G/DL (ref 32–36.5)
MCV RBC AUTO: 88.8 FL (ref 78–100)
MONOCYTES # BLD: 0.4 K/MCL (ref 0.3–0.9)
MONOCYTES NFR BLD: 5 %
NEUTROPHILS # BLD: 6.4 K/MCL (ref 1.8–7.7)
NEUTROPHILS NFR BLD: 74 %
NRBC BLD MANUAL-RTO: 0 /100 WBC
NT-PROBNP SERPL-MCNC: 90 PG/ML
P AXIS (DEGREES): 65
PLATELET # BLD AUTO: 265 K/MCL (ref 140–450)
POTASSIUM SERPL-SCNC: 4 MMOL/L (ref 3.4–5.1)
PR-INTERVAL (MSEC): 132
PROT SERPL-MCNC: 6.8 G/DL (ref 6.4–8.2)
QRS-INTERVAL (MSEC): 82
QT-INTERVAL (MSEC): 418
QTC: 448
R AXIS (DEGREES): 79
RAINBOW EXTRA TUBES HOLD SPECIMEN: NORMAL
RBC # BLD: 4.56 MIL/MCL (ref 4–5.2)
REPORT TEXT: NORMAL
SODIUM SERPL-SCNC: 137 MMOL/L (ref 135–145)
SYSTOLIC BLOOD PRESSURE: 149
T AXIS (DEGREES): 39
TROPONIN I SERPL HS-MCNC: <0.02 NG/ML
VENTRICULAR RATE EKG/MIN (BPM): 69
WBC # BLD: 8.5 K/MCL (ref 4.2–11)

## 2021-05-11 PROCEDURE — 10004157 XR CHEST AP OR PA

## 2021-05-11 PROCEDURE — 84484 ASSAY OF TROPONIN QUANT: CPT | Performed by: EMERGENCY MEDICINE

## 2021-05-11 PROCEDURE — 93971 EXTREMITY STUDY: CPT

## 2021-05-11 PROCEDURE — 71045 X-RAY EXAM CHEST 1 VIEW: CPT

## 2021-05-11 PROCEDURE — 10002800 HB RX 250 W HCPCS: Performed by: EMERGENCY MEDICINE

## 2021-05-11 PROCEDURE — 93005 ELECTROCARDIOGRAM TRACING: CPT | Performed by: EMERGENCY MEDICINE

## 2021-05-11 PROCEDURE — 10004157 US LOWER EXTREMITY VENOUS DUPLEX RIGHT

## 2021-05-11 PROCEDURE — 96374 THER/PROPH/DIAG INJ IV PUSH: CPT

## 2021-05-11 PROCEDURE — 85025 COMPLETE CBC W/AUTO DIFF WBC: CPT | Performed by: EMERGENCY MEDICINE

## 2021-05-11 PROCEDURE — 85379 FIBRIN DEGRADATION QUANT: CPT | Performed by: EMERGENCY MEDICINE

## 2021-05-11 PROCEDURE — 99285 EMERGENCY DEPT VISIT HI MDM: CPT

## 2021-05-11 PROCEDURE — 80053 COMPREHEN METABOLIC PANEL: CPT | Performed by: EMERGENCY MEDICINE

## 2021-05-11 PROCEDURE — 83880 ASSAY OF NATRIURETIC PEPTIDE: CPT | Performed by: EMERGENCY MEDICINE

## 2021-05-11 PROCEDURE — 36415 COLL VENOUS BLD VENIPUNCTURE: CPT

## 2021-05-11 RX ORDER — TRAMADOL HYDROCHLORIDE 50 MG/1
50 TABLET ORAL EVERY 6 HOURS PRN
Qty: 10 TABLET | Refills: 0 | Status: SHIPPED | OUTPATIENT
Start: 2021-05-11 | End: 2022-09-28 | Stop reason: ALTCHOICE

## 2021-05-11 RX ORDER — OMEPRAZOLE 40 MG/1
40 CAPSULE, DELAYED RELEASE ORAL DAILY
Qty: 30 CAPSULE | Refills: 0 | Status: SHIPPED | OUTPATIENT
Start: 2021-05-11

## 2021-05-11 RX ADMIN — HYDROMORPHONE HYDROCHLORIDE 0.5 MG: 1 INJECTION, SOLUTION INTRAMUSCULAR; INTRAVENOUS; SUBCUTANEOUS at 09:57

## 2021-05-11 RX ADMIN — KETOROLAC TROMETHAMINE 15 MG: 15 INJECTION, SOLUTION INTRAMUSCULAR; INTRAVENOUS at 09:26

## 2021-05-11 ASSESSMENT — PAIN DESCRIPTION - PAIN TYPE: TYPE: ACUTE PAIN

## 2021-05-11 ASSESSMENT — PAIN SCALES - GENERAL
PAINLEVEL_OUTOF10: 8
PAINLEVEL_OUTOF10: 8
PAINLEVEL_OUTOF10: 9

## 2021-10-04 ENCOUNTER — WALK IN (OUTPATIENT)
Dept: URGENT CARE | Age: 46
End: 2021-10-04

## 2021-10-04 VITALS
RESPIRATION RATE: 16 BRPM | OXYGEN SATURATION: 99 % | BODY MASS INDEX: 31.32 KG/M2 | DIASTOLIC BLOOD PRESSURE: 84 MMHG | TEMPERATURE: 98.4 F | HEART RATE: 70 BPM | WEIGHT: 200 LBS | SYSTOLIC BLOOD PRESSURE: 128 MMHG

## 2021-10-04 DIAGNOSIS — H10.9 CONJUNCTIVITIS OF LEFT EYE, UNSPECIFIED CONJUNCTIVITIS TYPE: Primary | ICD-10-CM

## 2021-10-04 PROCEDURE — 99202 OFFICE O/P NEW SF 15 MIN: CPT | Performed by: NURSE PRACTITIONER

## 2021-10-04 RX ORDER — POLYMYXIN B SULFATE AND TRIMETHOPRIM 1; 10000 MG/ML; [USP'U]/ML
1 SOLUTION OPHTHALMIC 4 TIMES DAILY
Qty: 10 ML | Refills: 0 | Status: SHIPPED | OUTPATIENT
Start: 2021-10-04 | End: 2021-10-11

## 2021-11-24 ENCOUNTER — WALK IN (OUTPATIENT)
Dept: URGENT CARE | Age: 46
End: 2021-11-24

## 2021-11-24 VITALS
WEIGHT: 228 LBS | BODY MASS INDEX: 35.71 KG/M2 | OXYGEN SATURATION: 98 % | HEART RATE: 70 BPM | SYSTOLIC BLOOD PRESSURE: 130 MMHG | RESPIRATION RATE: 16 BRPM | DIASTOLIC BLOOD PRESSURE: 98 MMHG | TEMPERATURE: 98.7 F

## 2021-11-24 DIAGNOSIS — N39.0 URINARY TRACT INFECTION WITHOUT HEMATURIA, SITE UNSPECIFIED: Primary | ICD-10-CM

## 2021-11-24 LAB
APPEARANCE UR: CLEAR
BACTERIA #/AREA URNS HPF: ABNORMAL /HPF
BILIRUB UR QL STRIP: NEGATIVE
COLOR UR: YELLOW
GLUCOSE UR STRIP-MCNC: NEGATIVE MG/DL
HGB UR QL STRIP: ABNORMAL
HYALINE CASTS #/AREA URNS LPF: ABNORMAL /LPF
KETONES UR STRIP-MCNC: NEGATIVE MG/DL
LEUKOCYTE ESTERASE UR QL STRIP: ABNORMAL
MUCOUS THREADS URNS QL MICRO: PRESENT
NITRITE UR QL STRIP: NEGATIVE
PH UR STRIP: 6 [PH] (ref 5–7)
PROT UR STRIP-MCNC: NEGATIVE MG/DL
RBC #/AREA URNS HPF: ABNORMAL /HPF
SP GR UR STRIP: 1.01 (ref 1–1.03)
SQUAMOUS #/AREA URNS HPF: ABNORMAL /HPF
UROBILINOGEN UR STRIP-MCNC: 0.2 MG/DL
WBC #/AREA URNS HPF: ABNORMAL /HPF

## 2021-11-24 PROCEDURE — 87186 SC STD MICRODIL/AGAR DIL: CPT | Performed by: INTERNAL MEDICINE

## 2021-11-24 PROCEDURE — 99213 OFFICE O/P EST LOW 20 MIN: CPT | Performed by: NURSE PRACTITIONER

## 2021-11-24 PROCEDURE — 87086 URINE CULTURE/COLONY COUNT: CPT | Performed by: INTERNAL MEDICINE

## 2021-11-24 PROCEDURE — 81001 URINALYSIS AUTO W/SCOPE: CPT | Performed by: INTERNAL MEDICINE

## 2021-11-24 PROCEDURE — 87088 URINE BACTERIA CULTURE: CPT | Performed by: INTERNAL MEDICINE

## 2021-11-24 RX ORDER — NITROFURANTOIN 25; 75 MG/1; MG/1
100 CAPSULE ORAL 2 TIMES DAILY
Qty: 14 CAPSULE | Refills: 0 | Status: SHIPPED | OUTPATIENT
Start: 2021-11-24 | End: 2021-12-01

## 2021-11-26 LAB — BACTERIA UR CULT: ABNORMAL

## 2023-02-09 PROBLEM — I10 ESSENTIAL HYPERTENSION: Status: ACTIVE | Noted: 2021-06-02

## 2023-02-09 PROBLEM — K43.2 INCISIONAL HERNIA: Status: ACTIVE | Noted: 2020-02-13

## 2023-02-09 PROBLEM — M54.16 LUMBAR RADICULOPATHY: Status: ACTIVE | Noted: 2022-09-06

## 2023-02-09 PROBLEM — M54.12 CERVICAL RADICULOPATHY: Status: ACTIVE | Noted: 2020-10-05

## 2023-02-09 PROBLEM — Z87.19 HISTORY OF VENTRAL HERNIA REPAIR: Status: ACTIVE | Noted: 2021-11-08

## 2023-02-09 PROBLEM — N39.3 SUI (STRESS URINARY INCONTINENCE, FEMALE): Status: ACTIVE | Noted: 2020-05-29

## 2023-02-09 PROBLEM — K92.1 BLOOD IN STOOL: Status: ACTIVE | Noted: 2020-08-12

## 2023-02-09 PROBLEM — M53.3 SACROILIAC JOINT PAIN: Status: ACTIVE | Noted: 2022-09-28

## 2023-02-09 PROBLEM — Z98.890 HISTORY OF VENTRAL HERNIA REPAIR: Status: ACTIVE | Noted: 2021-11-08

## 2023-02-09 PROBLEM — Z98.1 S/P CERVICAL SPINAL FUSION: Status: ACTIVE | Noted: 2021-06-02

## 2023-02-09 PROBLEM — K21.9 GASTROESOPHAGEAL REFLUX DISEASE WITHOUT ESOPHAGITIS: Status: ACTIVE | Noted: 2020-08-12

## 2023-02-09 PROBLEM — G43.519 MIGRAINE AURA, PERSISTENT, INTRACTABLE: Status: ACTIVE | Noted: 2021-05-20

## 2024-01-10 ENCOUNTER — HOSPITAL ENCOUNTER (EMERGENCY)
Facility: MEDICAL CENTER | Age: 49
End: 2024-01-10
Attending: EMERGENCY MEDICINE
Payer: COMMERCIAL

## 2024-01-10 ENCOUNTER — APPOINTMENT (OUTPATIENT)
Dept: RADIOLOGY | Facility: MEDICAL CENTER | Age: 49
End: 2024-01-10
Attending: EMERGENCY MEDICINE
Payer: COMMERCIAL

## 2024-01-10 VITALS
OXYGEN SATURATION: 92 % | TEMPERATURE: 97.1 F | HEART RATE: 64 BPM | DIASTOLIC BLOOD PRESSURE: 64 MMHG | HEIGHT: 66 IN | SYSTOLIC BLOOD PRESSURE: 124 MMHG | RESPIRATION RATE: 18 BRPM | BODY MASS INDEX: 36.28 KG/M2 | WEIGHT: 225.75 LBS

## 2024-01-10 DIAGNOSIS — S43.402A SPRAIN OF LEFT SHOULDER, UNSPECIFIED SHOULDER SPRAIN TYPE, INITIAL ENCOUNTER: ICD-10-CM

## 2024-01-10 DIAGNOSIS — M25.512 ACUTE PAIN OF LEFT SHOULDER: ICD-10-CM

## 2024-01-10 PROCEDURE — 73030 X-RAY EXAM OF SHOULDER: CPT | Mod: LT

## 2024-01-10 PROCEDURE — 99284 EMERGENCY DEPT VISIT MOD MDM: CPT

## 2024-01-10 PROCEDURE — A9270 NON-COVERED ITEM OR SERVICE: HCPCS | Performed by: EMERGENCY MEDICINE

## 2024-01-10 PROCEDURE — 700102 HCHG RX REV CODE 250 W/ 637 OVERRIDE(OP): Performed by: EMERGENCY MEDICINE

## 2024-01-10 RX ORDER — IBUPROFEN 600 MG/1
600 TABLET ORAL ONCE
Status: COMPLETED | OUTPATIENT
Start: 2024-01-10 | End: 2024-01-10

## 2024-01-10 RX ORDER — ACETAMINOPHEN 325 MG/1
650 TABLET ORAL ONCE
Status: COMPLETED | OUTPATIENT
Start: 2024-01-10 | End: 2024-01-10

## 2024-01-10 RX ORDER — IBUPROFEN 600 MG/1
600 TABLET ORAL EVERY 6 HOURS PRN
Qty: 20 TABLET | Refills: 0 | Status: SHIPPED | OUTPATIENT
Start: 2024-01-10

## 2024-01-10 RX ADMIN — ACETAMINOPHEN 650 MG: 325 TABLET, FILM COATED ORAL at 09:15

## 2024-01-10 RX ADMIN — IBUPROFEN 600 MG: 600 TABLET, FILM COATED ORAL at 09:15

## 2024-01-10 NOTE — ED PROVIDER NOTES
"  ER Provider Note    Scribed for Donovan Sanchez M.D. by Danyelle Castro. 1/10/2024   8:42 AM    Primary Care Provider: None noted    CHIEF COMPLAINT  Chief Complaint   Patient presents with    Shoulder Pain     Pt reporting L shoulder pain. Pt had a GLF on 1/4. Pt was seen in [place] ED. Pt was told nothing was broken and to follow up with ortho. Pt unable to follow up until 1/18 and the pain is unbearable. CMS intact     EXTERNAL RECORDS REVIEWED  Outpatient Notes: The patient was see at the [facility] in January 2022 for hand pain.    HPI/ROS  LIMITATION TO HISTORY   Select: : None  OUTSIDE HISTORIAN(S):  None noted    Jazlyn Kurtz is a 48 y.o. female who presents to the ED for evaluation of a ground level fall occurring 1/4/23. The patient notes that initially she slipped on her porch onto her left shoulder. She adds she was seen in [place] and had an X ray and was told to present to the ED if her pain worsens/does not resolve. She states she plans to follow up with ortho on 1/18.    PAST MEDICAL HISTORY  History reviewed. No pertinent past medical history.    SURGICAL HISTORY  History reviewed. No pertinent surgical history.    FAMILY HISTORY  History reviewed. No pertinent family history.    SOCIAL HISTORY   reports that she has been smoking cigarettes. She has never used smokeless tobacco. She reports that she does not currently use alcohol. She reports that she does not use drugs.    CURRENT MEDICATIONS  Previous Medications    IBUPROFEN (MOTRIN) 200 MG TAB    Take 200 mg by mouth every 6 hours as needed.       ALLERGIES  Allergies   Allergen Reactions    Pcn [Penicillins]         PHYSICAL EXAM  BP (!) 126/91   Pulse 86   Temp 36.2 °C (97.1 °F) (Temporal)   Resp 18   Ht 1.676 m (5' 6\")   Wt 102 kg (225 lb 12 oz)   SpO2 95%   BMI 36.44 kg/m²    Nursing note and vitals reviewed.  Constitutional: Well-developed and well-nourished. No distress.   HENT: Head is normocephalic and atraumatic. Oropharynx is clear and " moist without exudate or erythema.   Eyes: Pupils are equal, round, and reactive to light. Conjunctiva are normal.   Cardiovascular: Normal rate and regular rhythm. No murmur heard. Normal radial pulses.  Pulmonary/Chest: Breath sounds normal. No wheezes or rales.   Abdominal: Soft and non-tender. No distention    Musculoskeletal: Diffuse tenderness of the left shoulder, minimal pain with passive range of motion, no deformity, no tenderness of the clavicle, neurovascularly intact  Neurological: Awake, alert and oriented to person, place, and time. No focal deficits noted.  Skin: Skin is warm and dry. No rash.   Psychiatric: Normal mood and affect. Appropriate for clinical situation    DIAGNOSTIC STUDIES    Radiology:   This attending emergency physician has independently interpreted the diagnostic imaging associated with this visit and is awaiting the final reading from the radiologist.   Preliminary interpretation is a follows: Negative for fracture    Radiologist interpretation:   DX-SHOULDER 2+ LEFT   Final Result      1.  No radiographic evidence of acute traumatic injury.   2.  Degenerative spurring of the acromioclavicular joint.           INITIAL ASSESSMENT AND PLAN    8:42 AM - Patient was evaluated at bedside following a ground level fall. Ordered for Dx-Shoulder (left) to evaluate. The patient will be medicated with Motrin 600 mg and Tylenol 650 mg for her symptoms. I informed the patient of the plan for repeat X ray. Patient verbalizes understanding and support with my plan of care.  The patient will undergo trauma evaluation.    ED Observation Status? No; Patient does not meet criteria for ED Observation.      COURSE AND MEDICAL DECISION MAKING  12:26 PM - Imaging is negative for fracture. Patient was seen at bedside and updated. She was informed of the plan for discharge home with Motrin. She will follow up with ortho as needed. She will be placed in a sling. She was advised of all return precautions.  Patient verbalizes understanding and agreement to this plan of care.       DISPOSITION AND DISCUSSIONS    I have discussed management of the patient with the following physicians and SHAR's:  None noted    Discussion of management with other QHP or appropriate source(s): None     Escalation of care considered, and ultimately not performed: acute inpatient care management, however at this time, the patient is most appropriate for outpatient management.    Barriers to care at this time, including but not limited to: Patient does not have established PCP.     Decision tools and prescription drugs considered including, but not limited to: Pain Medication: I considered prescribing narcotic pain medication, however I feel pain should be adequately controlled with over the counter NSAIDs.    The patient will return for new or worsening symptoms and is stable at the time of discharge.    The patient is referred to a primary physician for blood pressure management, diabetic screening, and for all other preventative health concerns.    DISPOSITION:  Patient will be discharged home in stable condition.    FOLLOW UP:  [facility], Emergency Dept  [address] [facility] [address] [phone]    If symptoms worsen    [facility]  [address]  [facility] [address]    as scheduled      OUTPATIENT MEDICATIONS:  Discharge Medication List as of 1/10/2024  9:56 AM          FINAL DIAGNOSIS  1. Acute pain of left shoulder    2. Sprain of left shoulder, unspecified shoulder sprain type, initial encounter         Danyelle SHULTZ (Dylan), am scribing for, and in the presence of, Donovan Sanchez M.D..    Electronically signed by: Danyelle Mack), 1/10/2024    Donovan SHULTZ M.D. personally performed the services described in this documentation, as scribed by Danyelle Castro in my presence, and it is both accurate and complete.      The note accurately reflects work and decisions  made by me.  Donovan Sanchez M.D.  1/10/2024  2:31 PM

## 2024-01-10 NOTE — ED TRIAGE NOTES
"Chief Complaint   Patient presents with    Shoulder Pain     Pt reporting L shoulder pain. Pt had a GLF on 1/4. Pt was seen in [place] ED. Pt was told nothing was broken and to follow up with ortho. Pt unable to follow up until 1/18 and the pain is unbearable. CMS intact       Pt is alert and oriented, speaking in full sentences, follows commands and responds appropriately to questions. Resperations are even and unlabored.      Pt placed in lobby. Pt educated on triage process. Pt encouraged to alert staff for any changes.       BP (!) 126/91   Pulse 86   Temp 36.2 °C (97.1 °F) (Temporal)   Resp 18   Ht 1.676 m (5' 6\")   Wt 102 kg (225 lb 12 oz)   SpO2 95%    "

## 2024-05-09 ENCOUNTER — LAB REQUISITION (OUTPATIENT)
Age: 49
End: 2024-05-09

## 2024-05-09 DIAGNOSIS — R10.2 ACUTE PELVIC PAIN, FEMALE: ICD-10-CM

## 2024-11-20 ENCOUNTER — APPOINTMENT (OUTPATIENT)
Dept: CT IMAGING | Age: 49
End: 2024-11-20
Attending: EMERGENCY MEDICINE

## 2024-11-20 ENCOUNTER — HOSPITAL ENCOUNTER (EMERGENCY)
Age: 49
Discharge: HOME | End: 2024-11-20
Attending: EMERGENCY MEDICINE

## 2024-11-20 VITALS
HEIGHT: 67 IN | SYSTOLIC BLOOD PRESSURE: 135 MMHG | BODY MASS INDEX: 26.02 KG/M2 | RESPIRATION RATE: 16 BRPM | HEART RATE: 72 BPM | TEMPERATURE: 97.5 F | WEIGHT: 165.79 LBS | OXYGEN SATURATION: 100 % | DIASTOLIC BLOOD PRESSURE: 71 MMHG

## 2024-11-20 DIAGNOSIS — R10.12 ABDOMINAL PAIN, LEFT UPPER QUADRANT: Primary | ICD-10-CM

## 2024-11-20 DIAGNOSIS — K52.9 CHRONIC DIARRHEA: ICD-10-CM

## 2024-11-20 DIAGNOSIS — R10.9 LEFT FLANK PAIN: ICD-10-CM

## 2024-11-20 LAB
ALBUMIN SERPL-MCNC: 3.8 G/DL (ref 3.4–5)
ALBUMIN/GLOB SERPL: 1 {RATIO} (ref 1–2.4)
ALP SERPL-CCNC: 67 UNITS/L (ref 45–117)
ALT SERPL-CCNC: 25 UNITS/L
ANION GAP SERPL CALC-SCNC: 9 MMOL/L (ref 7–19)
AST SERPL-CCNC: 38 UNITS/L
BASOPHILS # BLD: 0.1 K/MCL (ref 0–0.3)
BASOPHILS NFR BLD: 1 %
BILIRUB SERPL-MCNC: 0.4 MG/DL (ref 0.2–1)
BUN SERPL-MCNC: 21 MG/DL (ref 6–20)
BUN/CREAT SERPL: 36 (ref 7–25)
CALCIUM SERPL-MCNC: 9.7 MG/DL (ref 8.4–10.2)
CHLORIDE SERPL-SCNC: 104 MMOL/L (ref 97–110)
CO2 SERPL-SCNC: 30 MMOL/L (ref 21–32)
CREAT SERPL-MCNC: 0.59 MG/DL (ref 0.51–0.95)
DEPRECATED RDW RBC: 42.7 FL (ref 39–50)
EGFRCR SERPLBLD CKD-EPI 2021: >90 ML/MIN/{1.73_M2}
EOSINOPHIL # BLD: 0.2 K/MCL (ref 0–0.5)
EOSINOPHIL NFR BLD: 4 %
ERYTHROCYTE [DISTWIDTH] IN BLOOD: 13.1 % (ref 11–15)
FASTING DURATION TIME PATIENT: ABNORMAL H
GLOBULIN SER-MCNC: 3.7 G/DL (ref 2–4)
GLUCOSE SERPL-MCNC: 91 MG/DL (ref 70–99)
HCT VFR BLD CALC: 43.9 % (ref 36–46.5)
HGB BLD-MCNC: 14.6 G/DL (ref 12–15.5)
IMM GRANULOCYTES # BLD AUTO: 0 K/MCL (ref 0–0.2)
IMM GRANULOCYTES # BLD: 0 %
LIPASE SERPL-CCNC: 145 UNITS/L (ref 15–77)
LYMPHOCYTES # BLD: 1.9 K/MCL (ref 1–4.8)
LYMPHOCYTES NFR BLD: 32 %
MCH RBC QN AUTO: 30 PG (ref 26–34)
MCHC RBC AUTO-ENTMCNC: 33.3 G/DL (ref 32–36.5)
MCV RBC AUTO: 90.1 FL (ref 78–100)
MONOCYTES # BLD: 0.4 K/MCL (ref 0.3–0.9)
MONOCYTES NFR BLD: 7 %
NEUTROPHILS # BLD: 3.4 K/MCL (ref 1.8–7.7)
NEUTROPHILS NFR BLD: 56 %
NRBC BLD MANUAL-RTO: 0 /100 WBC
PLATELET # BLD AUTO: 276 K/MCL (ref 140–450)
POTASSIUM SERPL-SCNC: 5.1 MMOL/L (ref 3.4–5.1)
PROT SERPL-MCNC: 7.5 G/DL (ref 6.4–8.2)
RBC # BLD: 4.87 MIL/MCL (ref 4–5.2)
SODIUM SERPL-SCNC: 138 MMOL/L (ref 135–145)
WBC # BLD: 6 K/MCL (ref 4.2–11)

## 2024-11-20 PROCEDURE — 74177 CT ABD & PELVIS W/CONTRAST: CPT

## 2024-11-20 PROCEDURE — 10002800 HB RX 250 W HCPCS: Performed by: EMERGENCY MEDICINE

## 2024-11-20 PROCEDURE — 10004651 HB RX, NO CHARGE ITEM: Performed by: EMERGENCY MEDICINE

## 2024-11-20 PROCEDURE — 85025 COMPLETE CBC W/AUTO DIFF WBC: CPT | Performed by: EMERGENCY MEDICINE

## 2024-11-20 PROCEDURE — 80053 COMPREHEN METABOLIC PANEL: CPT | Performed by: EMERGENCY MEDICINE

## 2024-11-20 PROCEDURE — 10002805 HB CONTRAST AGENT: Performed by: EMERGENCY MEDICINE

## 2024-11-20 PROCEDURE — 99284 EMERGENCY DEPT VISIT MOD MDM: CPT

## 2024-11-20 PROCEDURE — 83690 ASSAY OF LIPASE: CPT | Performed by: EMERGENCY MEDICINE

## 2024-11-20 PROCEDURE — 36415 COLL VENOUS BLD VENIPUNCTURE: CPT

## 2024-11-20 PROCEDURE — 96374 THER/PROPH/DIAG INJ IV PUSH: CPT

## 2024-11-20 RX ORDER — ONDANSETRON 4 MG/1
4 TABLET, ORALLY DISINTEGRATING ORAL EVERY 6 HOURS
Qty: 10 TABLET | Refills: 0 | Status: SHIPPED | OUTPATIENT
Start: 2024-11-20

## 2024-11-20 RX ORDER — ACETAMINOPHEN 500 MG
1000 TABLET ORAL ONCE
Status: COMPLETED | OUTPATIENT
Start: 2024-11-20 | End: 2024-11-20

## 2024-11-20 RX ORDER — KETOROLAC TROMETHAMINE 15 MG/ML
15 INJECTION, SOLUTION INTRAMUSCULAR; INTRAVENOUS ONCE
Status: COMPLETED | OUTPATIENT
Start: 2024-11-20 | End: 2024-11-20

## 2024-11-20 RX ADMIN — IOHEXOL 100 ML: 350 INJECTION, SOLUTION INTRAVENOUS at 17:53

## 2024-11-20 RX ADMIN — KETOROLAC TROMETHAMINE 15 MG: 15 INJECTION, SOLUTION INTRAMUSCULAR; INTRAVENOUS at 17:17

## 2024-11-20 RX ADMIN — ACETAMINOPHEN 1000 MG: 500 TABLET ORAL at 17:19

## 2024-11-20 SDOH — SOCIAL STABILITY: SOCIAL INSECURITY: HOW OFTEN DOES ANYONE, INCLUDING FAMILY AND FRIENDS, THREATEN YOU WITH HARM?: NEVER

## 2024-11-20 SDOH — SOCIAL STABILITY: SOCIAL INSECURITY: HOW OFTEN DOES ANYONE, INCLUDING FAMILY AND FRIENDS, SCREAM OR CURSE AT YOU?: NEVER

## 2024-11-20 SDOH — SOCIAL STABILITY: SOCIAL INSECURITY: HOW OFTEN DOES ANYONE, INCLUDING FAMILY AND FRIENDS, INSULT OR TALK DOWN TO YOU?: NEVER

## 2024-11-20 SDOH — SOCIAL STABILITY: SOCIAL INSECURITY: HOW OFTEN DOES ANYONE, INCLUDING FAMILY AND FRIENDS, PHYSICALLY HURT YOU?: NEVER

## 2024-11-20 ASSESSMENT — PAIN DESCRIPTION - PAIN TYPE: TYPE: ACUTE PAIN

## 2024-11-20 ASSESSMENT — PAIN SCALES - GENERAL
PAINLEVEL_OUTOF10: 10
PAINLEVEL_OUTOF10: 9